# Patient Record
Sex: MALE | Race: BLACK OR AFRICAN AMERICAN | NOT HISPANIC OR LATINO | URBAN - METROPOLITAN AREA
[De-identification: names, ages, dates, MRNs, and addresses within clinical notes are randomized per-mention and may not be internally consistent; named-entity substitution may affect disease eponyms.]

---

## 2024-11-18 ENCOUNTER — EMERGENCY (EMERGENCY)
Facility: HOSPITAL | Age: 49
LOS: 1 days | Discharge: HOME | End: 2024-11-18
Attending: EMERGENCY MEDICINE

## 2024-11-18 VITALS
OXYGEN SATURATION: 99 % | HEART RATE: 78 BPM | RESPIRATION RATE: 16 BRPM | SYSTOLIC BLOOD PRESSURE: 137 MMHG | DIASTOLIC BLOOD PRESSURE: 89 MMHG

## 2024-11-18 VITALS
WEIGHT: 229.94 LBS | RESPIRATION RATE: 16 BRPM | TEMPERATURE: 98 F | HEIGHT: 68 IN | SYSTOLIC BLOOD PRESSURE: 139 MMHG | OXYGEN SATURATION: 99 % | DIASTOLIC BLOOD PRESSURE: 91 MMHG | HEART RATE: 66 BPM

## 2024-11-18 PROCEDURE — 99284 EMERGENCY DEPT VISIT MOD MDM: CPT

## 2024-11-18 PROCEDURE — 99283 EMERGENCY DEPT VISIT LOW MDM: CPT

## 2024-11-18 RX ORDER — ACETAMINOPHEN 500 MG
975 TABLET ORAL ONCE
Refills: 0 | Status: COMPLETED | OUTPATIENT
Start: 2024-11-18 | End: 2024-11-18

## 2024-11-18 RX ORDER — METHOCARBAMOL 500 MG/1
1500 TABLET ORAL ONCE
Refills: 0 | Status: COMPLETED | OUTPATIENT
Start: 2024-11-18 | End: 2024-11-18

## 2024-11-18 RX ORDER — METHOCARBAMOL 500 MG/1
2 TABLET ORAL
Qty: 18 | Refills: 0
Start: 2024-11-18 | End: 2024-11-20

## 2024-11-18 RX ORDER — LIDOCAINE HYDROCHLORIDE 40 MG/ML
1 SOLUTION TOPICAL ONCE
Refills: 0 | Status: COMPLETED | OUTPATIENT
Start: 2024-11-18 | End: 2024-11-18

## 2024-11-18 RX ADMIN — LIDOCAINE HYDROCHLORIDE 1 PATCH: 40 SOLUTION TOPICAL at 13:17

## 2024-11-18 RX ADMIN — METHOCARBAMOL 1500 MILLIGRAM(S): 500 TABLET ORAL at 13:21

## 2024-11-18 RX ADMIN — Medication 975 MILLIGRAM(S): at 13:21

## 2024-11-18 NOTE — ED ADULT NURSE NOTE - OBJECTIVE STATEMENT
49 yo M presents to ED  A+Ox3 c/o back pain x 2 days. Patient states he was lifting cases of soda on Saturday for work, yesterday woke up with back pain. States the pain is in his lower back, is worse with movement and radiates up his back intermittently. Denies numbness, tingling, weakness. Breathing is spontaneous and unlabored on room air.

## 2024-11-18 NOTE — ED PROVIDER NOTE - CONDITION AT DISCHARGE:
PRINCIPAL DISCHARGE DIAGNOSIS  Diagnosis: Lumbar compression fracture  Assessment and Plan of Treatment: physical therapy  pain meds as needed  follow up with neurosurgery.      SECONDARY DISCHARGE DIAGNOSES  Diagnosis: Intractable back pain  Assessment and Plan of Treatment:      Improved

## 2024-11-18 NOTE — ED ADULT TRIAGE NOTE - CHIEF COMPLAINT QUOTE
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

## 2024-11-18 NOTE — ED PROVIDER NOTE - NSFOLLOWUPINSTRUCTIONS_ED_ALL_ED_FT
- stay hydrated.   -take all home medications as prescribed  - take tylenol 975mg and ibuprofen 600mg every 6 hours as needed for pain-take with meals.  -take methocarbamol 1500mg three times a day as needed for severe pain  -apply lidocaine patches every 12 hours    - follow up with your primary care provider in 1-2 days.  - follow up with the spine clinic if pain persists by calling [phone] to make an appointment    - return if symptoms worsen, fever, weakness, numbness/tingling, blurred vision, difficulty ambulating and all other concerns. - stay hydrated.   -refrain from heavy lifting, bending/twisting at the waist.   -take all home medications as prescribed  - take tylenol 975mg and ibuprofen 600mg every 6 hours as needed for pain-take with meals.  -take methocarbamol 1500mg three times a day as needed for severe pain  -apply lidocaine patches every 12 hours    - follow up with your primary care provider in 1-2 days.  - follow up with the spine clinic if pain persists by calling [phone] to make an appointment    - return if symptoms worsen, fever, weakness, numbness/tingling, blurred vision, difficulty ambulating and all other concerns.

## 2024-11-18 NOTE — ED ADULT NURSE NOTE - CAS TRG GEN SKIN COLOR
H&P reviewed  After examining the patient I find no changes in the patients condition since the H&P had been written  Discussed risks involved with surgery including recurrence, extensor lag of DIP joint, nail deformity  Patient verbalized understanding  There were no vitals filed for this visit  Normal for race

## 2024-11-18 NOTE — ED PROVIDER NOTE - CLINICAL SUMMARY MEDICAL DECISION MAKING FREE TEXT BOX
RGUJRAL 49yo male presents with back pain after carrying groceries. Denies any acute onset of pain, states it was gradual in onset and worse w movement. Denies any numbness, tingling or weakness. No cp, palp, sob. On exam, Patient is awake,alert,oriented x 3. Patient is well appearing and in no acute distress. Patient's chest is clear to ausculation, +s1s2. Abdomen is soft nd/nt +BS. Extremity with no swelling or calf tenderness. Back No midline ttp T/L spine, neuro intact.   Pain elicited w movement on exam. DDx Muscle strain/spasm, discussed pain control and monitoring.

## 2024-11-18 NOTE — ED PROVIDER NOTE - OBJECTIVE STATEMENT
49 yo M with no pmh here for evaluation of lower back pain x 8 days. Pt states pain started after carrying groceries up the stairs, awoke the neck day with severe pain to lower back. Has been taking 800mg ibuprofen twice a day and applying hot compresses with some improvement but concerned that the pain is still persisting. Pain will at times shoot up his back with movement, denies any other injury, no fevers or chills, no radiation to the abdomen or chest, no saddle anesthesia or urinary/bowel incontinence, no numbness or weakness.

## 2024-11-18 NOTE — ED ADULT TRIAGE NOTE - MEANS OF ARRIVAL
Detail Level: Simple Additional Notes: Pt was instructed to check daily glucose if levels elevate pt was instructed to d/c prednisone and contact office.\\n\\nPt was instructed to do labs, aware that enrollment for Praneethya will not be initiated until labs are reviewed. ambulatory

## 2024-11-18 NOTE — ED PROVIDER NOTE - PROGRESS NOTE DETAILS
Pt reevaluated--endorses improvement in pain. will f/u with spine clinic all questions answered. -Cecy Hill PA-C
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
## 2024-11-18 NOTE — ED PROVIDER NOTE - PATIENT PORTAL LINK FT
You can access the FollowMyHealth Patient Portal offered by [facility] by registering at the following website: [email or web address]. By joining "[employer]’s FollowMyHealth portal, you will also be able to view your health information using other applications (apps) compatible with our system.

## 2024-11-22 PROBLEM — Z00.00 ENCOUNTER FOR PREVENTIVE HEALTH EXAMINATION: Status: ACTIVE | Noted: 2024-11-22

## 2024-11-27 ENCOUNTER — APPOINTMENT (OUTPATIENT)
Age: 49
End: 2024-11-27